# Patient Record
Sex: MALE | Race: WHITE | NOT HISPANIC OR LATINO | URBAN - METROPOLITAN AREA
[De-identification: names, ages, dates, MRNs, and addresses within clinical notes are randomized per-mention and may not be internally consistent; named-entity substitution may affect disease eponyms.]

---

## 2018-02-15 ENCOUNTER — EMERGENCY (EMERGENCY)
Facility: HOSPITAL | Age: 28
LOS: 1 days | Discharge: HOME | End: 2018-02-15
Attending: EMERGENCY MEDICINE | Admitting: EMERGENCY MEDICINE
Payer: SELF-PAY

## 2018-02-15 VITALS
DIASTOLIC BLOOD PRESSURE: 71 MMHG | TEMPERATURE: 98 F | WEIGHT: 113.98 LBS | OXYGEN SATURATION: 98 % | RESPIRATION RATE: 20 BRPM | SYSTOLIC BLOOD PRESSURE: 119 MMHG | HEART RATE: 107 BPM

## 2018-02-15 DIAGNOSIS — G89.18 OTHER ACUTE POSTPROCEDURAL PAIN: ICD-10-CM

## 2018-02-15 DIAGNOSIS — Z88.8 ALLERGY STATUS TO OTHER DRUGS, MEDICAMENTS AND BIOLOGICAL SUBSTANCES STATUS: ICD-10-CM
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
## 2018-02-15 DIAGNOSIS — S81.802A UNSPECIFIED OPEN WOUND, LEFT LOWER LEG, INITIAL ENCOUNTER: ICD-10-CM

## 2018-02-15 DIAGNOSIS — Z96.7 PRESENCE OF OTHER BONE AND TENDON IMPLANTS: Chronic | ICD-10-CM
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
## 2018-02-15 PROCEDURE — 99283 EMERGENCY DEPT VISIT LOW MDM: CPT | Mod: 25

## 2018-02-15 PROCEDURE — 99053 MED SERV 10PM-8AM 24 HR FAC: CPT

## 2018-02-15 PROCEDURE — 99282 EMERGENCY DEPT VISIT SF MDM: CPT

## 2018-02-15 NOTE — ED PROVIDER NOTE - OBJECTIVE STATEMENT
had surgery Jan 20 on left leg tib/fib fracture in [place].  Then moved to [place] and has not had fu.  pain has been improving, off pain meds now

## 2018-02-15 NOTE — ED PROVIDER NOTE - MEDICAL DECISION MAKING DETAILS
28 yo needs fu after ortho surgery 3 weeks ago, [place].  surgical site looks good, no signs of infection.  placed another splint/ace because had gotten wet/dirty.  will give ortho clinic fu

## 2018-02-15 NOTE — ED ADULT NURSE NOTE - OBJECTIVE STATEMENT
pt was ped stuck on 1/19 in [place] sustaining a left tibial fx and had surgical repair.  pt did not follow up and has since moved to [facility] and this am slipped down the stairs.  now has left ankle pain again.  pt has splint intact from original surgery with staples still in left knee.

## 2018-02-15 NOTE — ED PROVIDER NOTE - LOCATION
leg short leg splint.  splint has gotten wet/dirty.  removed splint/ace and web roll.  skin normal, no redness/pus or warmth.  left foot nv intact

## 2022-12-05 NOTE — ED ADULT NURSE NOTE - [id number]_ED_A_ED
Problem: Knowledge Deficit  Goal: Patient/family/caregiver demonstrates understanding of disease process, treatment plan, medications, and discharge instructions  Description: Complete learning assessment and assess knowledge base    Interventions:  - Provide teaching at level of understanding  - Provide teaching via preferred learning methods  Outcome: Progressing
No